# Patient Record
Sex: FEMALE | Race: WHITE
[De-identification: names, ages, dates, MRNs, and addresses within clinical notes are randomized per-mention and may not be internally consistent; named-entity substitution may affect disease eponyms.]

---

## 2019-02-26 ENCOUNTER — HOSPITAL ENCOUNTER (EMERGENCY)
Dept: HOSPITAL 63 - ER | Age: 44
Discharge: HOME | End: 2019-02-26
Payer: COMMERCIAL

## 2019-02-26 VITALS — HEIGHT: 63 IN | WEIGHT: 196 LBS | BODY MASS INDEX: 34.73 KG/M2

## 2019-02-26 VITALS — DIASTOLIC BLOOD PRESSURE: 73 MMHG | SYSTOLIC BLOOD PRESSURE: 129 MMHG

## 2019-02-26 DIAGNOSIS — R55: Primary | ICD-10-CM

## 2019-02-26 DIAGNOSIS — F32.9: ICD-10-CM

## 2019-02-26 DIAGNOSIS — E86.0: ICD-10-CM

## 2019-02-26 DIAGNOSIS — I10: ICD-10-CM

## 2019-02-26 LAB
ALBUMIN SERPL-MCNC: 3.7 G/DL (ref 3.4–5)
ALBUMIN/GLOB SERPL: 1 {RATIO} (ref 1–1.7)
ALP SERPL-CCNC: 97 U/L (ref 46–116)
ALT SERPL-CCNC: 19 U/L (ref 14–59)
ANION GAP SERPL CALC-SCNC: 10 MMOL/L (ref 6–14)
APTT PPP: YELLOW S
AST SERPL-CCNC: 14 U/L (ref 15–37)
BACTERIA #/AREA URNS HPF: (no result) /HPF
BASOPHILS # BLD AUTO: 0 X10^3/UL (ref 0–0.2)
BASOPHILS NFR BLD: 0 % (ref 0–3)
BILIRUB SERPL-MCNC: 0.4 MG/DL (ref 0.2–1)
BILIRUB UR QL STRIP: (no result)
BUN/CREAT SERPL: 29 (ref 6–20)
CA-I SERPL ISE-MCNC: 20 MG/DL (ref 7–20)
CALCIUM SERPL-MCNC: 8.9 MG/DL (ref 8.5–10.1)
CHLORIDE SERPL-SCNC: 106 MMOL/L (ref 98–107)
CO2 SERPL-SCNC: 23 MMOL/L (ref 21–32)
CREAT SERPL-MCNC: 0.7 MG/DL (ref 0.6–1)
EOSINOPHIL NFR BLD: 0.1 X10^3/UL (ref 0–0.7)
EOSINOPHIL NFR BLD: 2 % (ref 0–3)
ERYTHROCYTE [DISTWIDTH] IN BLOOD BY AUTOMATED COUNT: 14.7 % (ref 11.5–14.5)
FIBRINOGEN PPP-MCNC: (no result) MG/DL
GFR SERPLBLD BASED ON 1.73 SQ M-ARVRAT: 91.8 ML/MIN
GLOBULIN SER-MCNC: 3.6 G/DL (ref 2.2–3.8)
GLUCOSE SERPL-MCNC: 100 MG/DL (ref 70–99)
GLUCOSE UR STRIP-MCNC: (no result) MG/DL
HCT VFR BLD CALC: 42.5 % (ref 36–47)
HGB BLD-MCNC: 14.1 G/DL (ref 12–15.5)
HYALINE CASTS #/AREA URNS LPF: (no result) /HPF
INFLUENZA A PATIENT: NEGATIVE
INFLUENZA B PATIENT: NEGATIVE
LYMPHOCYTES # BLD: 0.8 X10^3/UL (ref 1–4.8)
LYMPHOCYTES NFR BLD AUTO: 9 % (ref 24–48)
MCH RBC QN AUTO: 28 PG (ref 25–35)
MCHC RBC AUTO-ENTMCNC: 33 G/DL (ref 31–37)
MCV RBC AUTO: 84 FL (ref 79–100)
MONO #: 0.5 X10^3/UL (ref 0–1.1)
MONOCYTES NFR BLD: 6 % (ref 0–9)
NEUT #: 7.8 X10^3UL (ref 1.8–7.7)
NEUTROPHILS NFR BLD AUTO: 84 % (ref 31–73)
NITRITE UR QL STRIP: (no result)
PLATELET # BLD AUTO: 188 X10^3/UL (ref 140–400)
POTASSIUM SERPL-SCNC: 4.1 MMOL/L (ref 3.5–5.1)
PREG TEST PT QUAL: NEGATIVE
PROT SERPL-MCNC: 7.3 G/DL (ref 6.4–8.2)
RBC # BLD AUTO: 5.08 X10^6/UL (ref 3.5–5.4)
RBC #/AREA URNS HPF: 0 /HPF (ref 0–2)
SODIUM SERPL-SCNC: 139 MMOL/L (ref 136–145)
SP GR UR STRIP: >=1.03
SQUAMOUS #/AREA URNS LPF: (no result) /LPF
UROBILINOGEN UR-MCNC: 0.2 MG/DL
WBC # BLD AUTO: 9.3 X10^3/UL (ref 4–11)
WBC #/AREA URNS HPF: (no result) /HPF (ref 0–4)

## 2019-02-26 PROCEDURE — 85025 COMPLETE CBC W/AUTO DIFF WBC: CPT

## 2019-02-26 PROCEDURE — 87804 INFLUENZA ASSAY W/OPTIC: CPT

## 2019-02-26 PROCEDURE — 74022 RADEX COMPL AQT ABD SERIES: CPT

## 2019-02-26 PROCEDURE — 83605 ASSAY OF LACTIC ACID: CPT

## 2019-02-26 PROCEDURE — 84484 ASSAY OF TROPONIN QUANT: CPT

## 2019-02-26 PROCEDURE — 96360 HYDRATION IV INFUSION INIT: CPT

## 2019-02-26 PROCEDURE — 96361 HYDRATE IV INFUSION ADD-ON: CPT

## 2019-02-26 PROCEDURE — 80053 COMPREHEN METABOLIC PANEL: CPT

## 2019-02-26 PROCEDURE — 36415 COLL VENOUS BLD VENIPUNCTURE: CPT

## 2019-02-26 PROCEDURE — 84703 CHORIONIC GONADOTROPIN ASSAY: CPT

## 2019-02-26 PROCEDURE — 81001 URINALYSIS AUTO W/SCOPE: CPT

## 2019-02-26 PROCEDURE — 87086 URINE CULTURE/COLONY COUNT: CPT

## 2019-02-26 NOTE — PHYS DOC
Past History


Past Medical History:  Depression, Hypertension


Past Surgical History:  Colectomy, Tonsillectomy


Smoking:  Non-smoker


Alcohol Use:  None


Drug Use:  None





Adult General


Chief Complaint


Chief Complaint:  SYNCOPE





HPI


HPI





Patient is a 44-year-old female who presents to the ED via EMS with a chief 

complaint of syncope. Patient states that this morning around 6:30 AM she had 

an episode of explosive diarrhea after that she had a syncopal episode. Patient 

states that her children were home and state that she passed out for about a 

minute. Her children called EMS who came to check on her. Patient's states that 

EMS told her that her blood pressure was very low that it was in the 90s. 

Patient states that she takes blood pressure medication every morning that has 

not taken it this morning. Patient states that she is nauseous but did not have 

any vomiting. Patient denies abdominal pain. Patient denies fever, chills, 

vomiting, dysuria, pregnancy, headache, chest pain, shortness of breath. 

Patient states that she is feeling fine currently. States that she is at her 

baseline. Patient states that she had previous episode where she also passed 

out at that time she tested positive for influenza.





Review of Systems


Review of Systems


Constitutional: Denies fever or chills 


HENT: Denies nasal congestion, sore throat, sinus tenderness


Respiratory: Denies cough or shortness of breath 


Cardiovascular: Denies CP


GI: Denies abdominal pain, vomiting or diarrhea. Complains of mild nausea


: Denies dysuria or hematuria 


Musculoskeletal: Denies back pain or joint pain 


Skin: Denies rash or skin lesions


Neurologic: Denies headache, focal weakness or sensory changes





Complete systems were reviewed and found to be within normal limits, except as 

documented in this note.





Allergies


Allergies





Allergies








Coded Allergies Type Severity Reaction Last Updated Verified


 


  No Known Allergies Allergy Unknown  2/26/19 Yes











Physical Exam


Physical Exam


Constitutional: Well developed, well nourished, no acute distress, non-toxic 

appearance.


HENT: Normocephalic, atraumatic, normocaphalic


Eyes: PERRL, EOMI


Neck: Normal range of motion, no tenderness, supple


Cardiovascular:Heart rate regular rhythm, no murmur 


Resp:  Bilateral breath sounds clear to auscultation 


Abdomen: Soft, no tenderness, no distension


Skin: Warm, dry, no erythema, no rash.


Back: No tenderness, no CVA tenderness.


Extremities: No tenderness, ROM intact, no edema.


Neurologic: Alert and oriented X 3, normal motor function, normal sensory 

function, no focal deficits noted.


Psychologic: Affect normal, judgement normal, mood normal.





Current Patient Data


Vital Signs





 Vital Signs








  Date Time  Temp Pulse Resp B/P (MAP) Pulse Ox O2 Delivery O2 Flow Rate FiO2


 


2/26/19 07:53 97.7 76 16  100 Room Air  











EKG


EKG


EKG interpretation:


7:58 on 2/26/2019


Heart rate: 65


Sinus bradycardia


Prolonged W[place] interval


Normal axis


Nonspecific ST changes


No STEMI





Radiology/Procedures


Radiology/Procedures


[]





Course & Med Decision Making


Course & Med Decision Making


Pertinent Labs and Imaging studies reviewed. (See chart for details)





Ordered labs, troponin, EKG, urine, urine pregnancy, acute abdominal series.





EKG shows no acute changes. There is increased W[place] interval.


Troponin is negative.


Urine pregnancy is negative. There is not enough urine given the patient and so 

we had to do urine pregnancy blood test.


UA is negative for UTI. There is mild leukocyte esterase.


Patient's lactate is 1.6.


Patient has already received 1 L IV fluids and states that she is feeling 

better.


I have ordered a second liter of IV fluids.





Patient has no focal deficits on exam. She is at baseline neurologically. I did 

not order a CT head at this time.








EXAM: Acute abdomen complete.


 


HISTORY: Syncope.


 


COMPARISON: None.


 


FINDINGS: A frontal view of the chest and frontal upright and supine views


of the abdomen are obtained. There is no infiltrate, pleural effusion or 


pneumothorax. There are calcified granulomas within the right hilum and 


right mid thorax. There are nonspecific air-fluid levels within 


nondistended loops of bowel within the upper abdomen and right midabdomen.


No dilated loop of bowel is seen. There is no transition point. There is 


no free air. There are cholecystectomy clips.


 


IMPRESSION: 


1. No acute pulmonary finding.


2. Nonspecific bowel gas pattern, without evidence of obstruction.


 


Electronically signed by: Janie Alonzo MD (2/26/2019 10:58 AM) [place] [id number]








Acute abdominal series does not show any acute disease.


Discussed results and plan of care the patient


Patient was discharged home for outpatient follow-up


Appropriate discharge instructions. Patient returned to the emergency 

department evaluation.


Patient instructed to follow up with his extremities.





Dragon Disclaimer


Dragon Disclaimer


This electronic medical record was generated, in whole or in part, using a 

voice recognition dictation system.





Departure


Departure:


Impression:  


 Primary Impression:  


 Syncope


 Additional Impression:  


 Dehydration


Disposition:  01 HOME, SELF-CARE


Condition:  IMPROVED


Referrals:  


[facility] (PCP)


in 1-2 days


Patient Instructions:  Dehydration, Adult, Syncope





Additional Instructions:  


Please follow up with this and wanted to reduce.


Please return to ED if symptoms worsen or if any concerns.





Problem Qualifiers











KENNEDI BELLAMY DO Feb 26, 2019 08:28

## 2019-02-26 NOTE — RAD
EXAM: Acute abdomen complete.

 

HISTORY: Syncope.

 

COMPARISON: None.

 

FINDINGS: A frontal view of the chest and frontal upright and supine views

of the abdomen are obtained. There is no infiltrate, pleural effusion or 

pneumothorax. There are calcified granulomas within the right hilum and 

right mid thorax. There are nonspecific air-fluid levels within 

nondistended loops of bowel within the upper abdomen and right midabdomen.

No dilated loop of bowel is seen. There is no transition point. There is 

no free air. There are cholecystectomy clips.

 

IMPRESSION: 

1. No acute pulmonary finding.

2. Nonspecific bowel gas pattern, without evidence of obstruction.

 

Electronically signed by: Janie Alonzo MD (2/26/2019 10:58 AM) [id number]

## 2022-05-17 ENCOUNTER — HOSPITAL ENCOUNTER (INPATIENT)
Dept: HOSPITAL 63 - ER | Age: 47
LOS: 2 days | Discharge: ANOTHER HOSPITAL | DRG: 641 | End: 2022-05-19
Attending: INTERNAL MEDICINE | Admitting: INTERNAL MEDICINE
Payer: COMMERCIAL

## 2022-05-17 VITALS — HEIGHT: 63 IN | WEIGHT: 198.86 LBS | BODY MASS INDEX: 35.23 KG/M2

## 2022-05-17 VITALS — DIASTOLIC BLOOD PRESSURE: 78 MMHG | SYSTOLIC BLOOD PRESSURE: 114 MMHG

## 2022-05-17 VITALS — DIASTOLIC BLOOD PRESSURE: 75 MMHG | SYSTOLIC BLOOD PRESSURE: 114 MMHG

## 2022-05-17 DIAGNOSIS — R50.9: ICD-10-CM

## 2022-05-17 DIAGNOSIS — N93.8: ICD-10-CM

## 2022-05-17 DIAGNOSIS — D63.8: ICD-10-CM

## 2022-05-17 DIAGNOSIS — Z90.49: ICD-10-CM

## 2022-05-17 DIAGNOSIS — E86.0: Primary | ICD-10-CM

## 2022-05-17 DIAGNOSIS — D72.829: ICD-10-CM

## 2022-05-17 DIAGNOSIS — Z20.822: ICD-10-CM

## 2022-05-17 DIAGNOSIS — I10: ICD-10-CM

## 2022-05-17 DIAGNOSIS — F32.A: ICD-10-CM

## 2022-05-17 DIAGNOSIS — D53.9: ICD-10-CM

## 2022-05-17 LAB
% BANDS: 9 % (ref 0–9)
% LYMPHS: 6 % (ref 24–48)
% MONOS: 1 % (ref 0–10)
% SEGS: 83 % (ref 35–66)
ALBUMIN SERPL-MCNC: 3.2 G/DL (ref 3.4–5)
ALBUMIN/GLOB SERPL: 1 {RATIO} (ref 1–1.7)
ALP SERPL-CCNC: 83 U/L (ref 46–116)
ALT SERPL-CCNC: 21 U/L (ref 14–59)
ANION GAP SERPL CALC-SCNC: 12 MMOL/L (ref 6–14)
ANISOCYTOSIS BLD QL SMEAR: SLIGHT
APTT PPP: YELLOW S
AST SERPL-CCNC: 17 U/L (ref 15–37)
BACTERIA #/AREA URNS HPF: 0 /HPF
BASOPHILS # BLD AUTO: 0.5 X10^3/UL (ref 0–0.2)
BASOPHILS NFR BLD: 1 % (ref 0–3)
BILIRUB SERPL-MCNC: 0.5 MG/DL (ref 0.2–1)
BUN/CREAT SERPL: 19 (ref 6–20)
CA-I SERPL ISE-MCNC: 15 MG/DL (ref 7–20)
CALCIUM SERPL-MCNC: 8.4 MG/DL (ref 8.5–10.1)
CHLORIDE SERPL-SCNC: 103 MMOL/L (ref 98–107)
CO2 SERPL-SCNC: 22 MMOL/L (ref 21–32)
CREAT SERPL-MCNC: 0.8 MG/DL (ref 0.6–1)
EOSINOPHIL NFR BLD AUTO: 1 % (ref 0–5)
EOSINOPHIL NFR BLD: 0 % (ref 0–3)
EOSINOPHIL NFR BLD: 0 X10^3/UL (ref 0–0.7)
ERYTHROCYTE [DISTWIDTH] IN BLOOD BY AUTOMATED COUNT: 18.6 % (ref 11.5–14.5)
FECAL OB PT: NEGATIVE
FIBRINOGEN PPP-MCNC: CLEAR MG/DL
GFR SERPLBLD BASED ON 1.73 SQ M-ARVRAT: 77.6 ML/MIN
GLOBULIN SER-MCNC: 3.3 G/DL (ref 2.2–3.8)
GLUCOSE SERPL-MCNC: 125 MG/DL (ref 70–99)
GLUCOSE UR STRIP-MCNC: (no result) MG/DL
HCT VFR BLD CALC: 27 % (ref 36–47)
HGB BLD-MCNC: 7.9 G/DL (ref 12–15.5)
HYPOCHROMIA BLD QL SMEAR: (no result)
INFLUENZA A PATIENT: NEGATIVE
INFLUENZA B PATIENT: NEGATIVE
LYMPHOCYTES # BLD: 1.2 X10^3/UL (ref 1–4.8)
LYMPHOCYTES NFR BLD AUTO: 4 % (ref 24–48)
MCH RBC QN AUTO: 18 PG (ref 25–35)
MCHC RBC AUTO-ENTMCNC: 29 G/DL (ref 31–37)
MCV RBC AUTO: 61 FL (ref 79–100)
MICROCYTES BLD QL SMEAR: (no result)
MONO #: 1.5 X10^3/UL (ref 0–1.1)
MONOCYTES NFR BLD: 5 % (ref 0–9)
NEUT #: 29.3 X10^3UL (ref 1.8–7.7)
NEUTROPHILS NFR BLD AUTO: 90 % (ref 31–73)
NITRITE UR QL STRIP: (no result)
PLATELET # BLD AUTO: 265 X10^3/UL (ref 140–400)
PLATELET # BLD EST: ADEQUATE 10*3/UL
POTASSIUM SERPL-SCNC: 3.6 MMOL/L (ref 3.5–5.1)
PROT SERPL-MCNC: 6.5 G/DL (ref 6.4–8.2)
RBC # BLD AUTO: 4.46 X10^6/UL (ref 3.5–5.4)
RBC #/AREA URNS HPF: 0 /HPF (ref 0–2)
SODIUM SERPL-SCNC: 137 MMOL/L (ref 136–145)
SP GR UR STRIP: >=1.03
SQUAMOUS #/AREA URNS LPF: (no result) /LPF
UROBILINOGEN UR-MCNC: 0.2 MG/DL
WBC # BLD AUTO: 32.5 X10^3/UL (ref 4–11)
WBC #/AREA URNS HPF: (no result) /HPF (ref 0–4)

## 2022-05-17 PROCEDURE — 86900 BLOOD TYPING SEROLOGIC ABO: CPT

## 2022-05-17 PROCEDURE — 86850 RBC ANTIBODY SCREEN: CPT

## 2022-05-17 PROCEDURE — 85025 COMPLETE CBC W/AUTO DIFF WBC: CPT

## 2022-05-17 PROCEDURE — 78582 LUNG VENTILAT&PERFUS IMAGING: CPT

## 2022-05-17 PROCEDURE — 30233N1 TRANSFUSION OF NONAUTOLOGOUS RED BLOOD CELLS INTO PERIPHERAL VEIN, PERCUTANEOUS APPROACH: ICD-10-PCS | Performed by: HOSPITALIST

## 2022-05-17 PROCEDURE — A9558 XE133 XENON 10MCI: HCPCS

## 2022-05-17 PROCEDURE — 71275 CT ANGIOGRAPHY CHEST: CPT

## 2022-05-17 PROCEDURE — 87040 BLOOD CULTURE FOR BACTERIA: CPT

## 2022-05-17 PROCEDURE — U0003 INFECTIOUS AGENT DETECTION BY NUCLEIC ACID (DNA OR RNA); SEVERE ACUTE RESPIRATORY SYNDROME CORONAVIRUS 2 (SARS-COV-2) (CORONAVIRUS DISEASE [COVID-19]), AMPLIFIED PROBE TECHNIQUE, MAKING USE OF HIGH THROUGHPUT TECHNOLOGIES AS DESCRIBED BY CMS-2020-01-R: HCPCS

## 2022-05-17 PROCEDURE — 80048 BASIC METABOLIC PNL TOTAL CA: CPT

## 2022-05-17 PROCEDURE — 93005 ELECTROCARDIOGRAM TRACING: CPT

## 2022-05-17 PROCEDURE — 86078 PHYS BLOOD BANK SERV REACTJ: CPT

## 2022-05-17 PROCEDURE — 86920 COMPATIBILITY TEST SPIN: CPT

## 2022-05-17 PROCEDURE — 36415 COLL VENOUS BLD VENIPUNCTURE: CPT

## 2022-05-17 PROCEDURE — P9016 RBC LEUKOCYTES REDUCED: HCPCS

## 2022-05-17 PROCEDURE — 96361 HYDRATE IV INFUSION ADD-ON: CPT

## 2022-05-17 PROCEDURE — 36430 TRANSFUSION BLD/BLD COMPNT: CPT

## 2022-05-17 PROCEDURE — 86901 BLOOD TYPING SEROLOGIC RH(D): CPT

## 2022-05-17 PROCEDURE — 84484 ASSAY OF TROPONIN QUANT: CPT

## 2022-05-17 PROCEDURE — 70450 CT HEAD/BRAIN W/O DYE: CPT

## 2022-05-17 PROCEDURE — 82274 ASSAY TEST FOR BLOOD FECAL: CPT

## 2022-05-17 PROCEDURE — 87428 SARSCOV & INF VIR A&B AG IA: CPT

## 2022-05-17 PROCEDURE — 71045 X-RAY EXAM CHEST 1 VIEW: CPT

## 2022-05-17 PROCEDURE — 85379 FIBRIN DEGRADATION QUANT: CPT

## 2022-05-17 PROCEDURE — 81001 URINALYSIS AUTO W/SCOPE: CPT

## 2022-05-17 PROCEDURE — A9540 TC99M MAA: HCPCS

## 2022-05-17 PROCEDURE — 85007 BL SMEAR W/DIFF WBC COUNT: CPT

## 2022-05-17 PROCEDURE — 96360 HYDRATION IV INFUSION INIT: CPT

## 2022-05-17 PROCEDURE — 80053 COMPREHEN METABOLIC PANEL: CPT

## 2022-05-17 RX ADMIN — SODIUM CHLORIDE SCH MLS/HR: 0.9 INJECTION, SOLUTION INTRAVENOUS at 22:09

## 2022-05-17 NOTE — PHYS DOC
Past History


Past Medical History:  Depression, Hypertension


Past Surgical History:  Cholecystectomy


Smoking:  Non-smoker


Alcohol Use:  None


Drug Use:  None





General Adult


EDM:


Chief Complaint:  NEAR SYCOPE





HPI:


HPI:


Patient is a 47-year-old female who presents to the emergency department for 

syncope.  Patient reports that yesterday she started experiencing fever, chills,

decreased appetite and a generalized headache.  She reports that today she has 

had multiple syncopal episodes.  She had a negative at-home COVID test.  Patient

reports that she does have a history of syncope associated with fevers.  She 

reports that she feels lightheaded with position changes which causes her to 

have syncopal episodes.  She is also reporting mild nausea.  She denies 

vomiting, diarrhea, vaginal bleeding, blood in her stools, chest pain, shortness

of breath, cough, sick exposures.  She denies any lightheadedness currently.  

She took Tylenol and ibuprofen 2 hours prior to arrival.





Review of Systems:


Review of Systems:


Constitutional:  see HPI


Respiratory:  see HPI


Cardiovascular:  see HPI


GI:  see HPI


Neurologic:  see HPI





Allergies:


Allergies:





Allergies








Coded Allergies Type Severity Reaction Last Updated Verified


 


  No Known Allergies Allergy Unknown  2/26/19 Yes











Physical Exam:


PE:





Constitutional: Well developed, well nourished, no acute distress, non-toxic 

appearance. []


HENT: Normocephalic, atraumatic, bilateral external ears normal, oropharynx zion

st, no oral exudates, nose normal. []


Eyes: PERRL, 4mm bilaterally, EOMI, conjunctiva normal, no discharge. [] 


Neck: Normal range of motion, no tenderness, supple, no stridor. [] 


Cardiovascular:Heart rate regular rhythm, no murmur []


Lungs & Thorax:  Bilateral breath sounds clear to auscultation []


Abdomen: Bowel sounds normal, soft, no tenderness, no masses, no pulsatile 

masses. [] 


Skin: Warm, dry, no erythema, no rash. [] 


Back: No tenderness, normal ROM


Extremities: No tenderness, no cyanosis, no clubbing, ROM intact, no edema. [] 


Neurologic: Alert and oriented X 3, normal motor function, normal sensory 

function, no focal deficits noted, no limb ataxia, no pronator drift, equal 

 strengths, normal speech. []


Psychologic: Affect normal, judgement normal, mood normal. []





Current Patient Data:


Labs:





Laboratory Tests








Test


 5/17/22


19:52 5/17/22


19:57 5/17/22


20:49


 


D-Dimer (Kavya) 0.70 mg/L   


 


White Blood Count  32.5 x10^3/uL  


 


Red Blood Count  4.46 x10^6/uL  


 


Hemoglobin  7.9 g/dL  


 


Hematocrit  27.0 %  


 


Mean Corpuscular Volume  61 fL  


 


Mean Corpuscular Hemoglobin  18 pg  


 


Mean Corpuscular Hemoglobin


Concent 


 29 g/dL 


 





 


Red Cell Distribution Width  18.6 %  


 


Platelet Count  265 x10^3/uL  


 


Neutrophils (%) (Auto)  90 %  


 


Lymphocytes (%) (Auto)  4 %  


 


Monocytes (%) (Auto)  5 %  


 


Eosinophils (%) (Auto)  0 %  


 


Basophils (%) (Auto)  1 %  


 


Neutrophils # (Auto)  29.3 x10^3uL  


 


Lymphocytes # (Auto)  1.2 x10^3/uL  


 


Monocytes # (Auto)  1.5 x10^3/uL  


 


Eosinophils # (Auto)  0.0 x10^3/uL  


 


Basophils # (Auto)  0.5 x10^3/uL  


 


Segmented Neutrophils %  83 %  


 


Band Neutrophils %  9 %  


 


Lymphocytes %  6 %  


 


Monocytes %  1 %  


 


Eosinophils %  1 %  


 


Platelet Estimate  Adequate  


 


Hypochromasia  Mod  


 


Anisocytosis  Slight  


 


Microcytosis  Mod  


 


Sodium Level  137 mmol/L  


 


Potassium Level  3.6 mmol/L  


 


Chloride Level  103 mmol/L  


 


Carbon Dioxide Level  22 mmol/L  


 


Anion Gap  12  


 


Blood Urea Nitrogen  15 mg/dL  


 


Creatinine  0.8 mg/dL  


 


Estimated GFR


(Cockcroft-Gault) 


 77.6 


 





 


BUN/Creatinine Ratio  19  


 


Glucose Level  125 mg/dL  


 


Calcium Level  8.4 mg/dL  


 


Total Bilirubin  0.5 mg/dL  


 


Aspartate Amino Transf


(AST/SGOT) 


 17 U/L 


 





 


Alanine Aminotransferase


(ALT/SGPT) 


 21 U/L 


 





 


Alkaline Phosphatase  83 U/L  


 


Troponin I High Sensitivity  4 ng/L  


 


Total Protein  6.5 g/dL  


 


Albumin  3.2 g/dL  


 


Albumin/Globulin Ratio  1.0  


 


Influenza Type A (Rapid)   Negative 


 


Influenza Type B (Rapid)   Negative 


 


SARS-CoV-2 Antigen (Rapid)   Negative 








Current Medications








 Medications


  (Trade)  Dose


 Ordered  Sig/Victorino


 Route


 PRN Reason  Start Time


 Stop Time Status Last Admin


Dose Admin


 


 Sodium Chloride  1,000 ml @ 


 1,000 mls/hr  1X  ONCE


 IV


   5/17/22 20:15


 5/17/22 21:14 DC 5/17/22 20:15











Vital Signs:





                                   Vital Signs








  Date Time  Temp Pulse Resp B/P (MAP) Pulse Ox O2 Delivery O2 Flow Rate FiO2


 


5/17/22 19:55 98.3 88 19 106/79 (88) 99 Room Air  











EKG:


EKG:


EKG performed by ER staff at 2024 shows sinus rhythm with a rate of 93, no STEMI

 read by Dr. Campbell at 2028.  []





Radiology/Procedures:


Radiology/Procedures:


[]


CT Head W/O Contrast:





History: Reason: syncope, dizziness / Spl. Instructions:  / History: 





Comparison: none





Axial images were obtained without contrast.





There is no mass effect, extraaxial fluid collections or hydrocephalus.  There 

is no gross bleed. Mild, patchy subcortical white matter hypoattenuation is seen

 in the frontal lobes.There is no focal loss of gray-white matter distinction to

 suggest acute ischemia, i.e. stroke.





Impression:  Mild white matter changes likely secondary to chronic small vessel 

disease.





RS Compliance Statement:





One or more of the following individualized dose reduction techniques were 

utilized for this examination:  


1. Automated exposure control  


2. Adjustment of the mA and/or kV according to patient size  


3. Use of iterative reconstruction technique





Electronically signed by: Nahun Kessler III, MD (5/17/2022 8:49 PM) [facility]














DICTATED AND SIGNED BY:     NAHUN KESSLER III, MD


DATE:     05/17/22 2046





CC: MARIELLA DALLAS KIANA L APRN ~





Heart Score:


C/O Chest Pain:  No


Risk Factors:


Risk Factors:  DM, Current or recent (<one month) smoker, HTN, HLP, family 

history of CAD, obesity.


Risk Scores:


Score 0 - 3:  2.5% MACE over next 6 weeks - Discharge Home


Score 4 - 6:  20.3% MACE over next 6 weeks - Admit for Clinical Observation


Score 7 - 10:  72.7% MACE over next 6 weeks - Early Invasive Strategies





Course & Med Decision Making:


Course & Med Decision Making


Pertinent Labs and Imaging studies reviewed. (See chart for details)





Patient presents to the ER for syncope associated with fever, chills, decreased 

appetite. Patient reports a hx of syncope with fevers. Work up consists of blood

 work including troponin, UA, EKG, CT imaging of head. Patient reports that she 

feels lightheaded with position changes especially standing which causes her 

syncope, therefore, she will be treated with IVF for suspected orthostatic 

hypotension. 


Patient CT scan of her head does not show any acute findings but chronic small v

essel changes.  Patient was noted to have leukocytosis with a white blood cell 

count of 32.5.  At this time we are still waiting on urinalysis to rule out 

urinary tract infection.  Chest x-ray was also ordered.  Patient was noted to 

have a hemoglobin of 7.9 hematocrit of 27.  Fecal occult was obtained.  Patient 

will need to have a blood transfusion she is tachycardic with a heart rate of 

109 and is having syncope with a hemoglobin of less than 8.  Type and screen 

ordered.  1 unit of packed red blood cells ordered for patient.  Patient's D-

dimer did come back slightly elevated.  Due to national contrast shortage, 

patient will have a VQ scan to rule out pulmonary embolisms while inpatient.  

COVID PCR ordered.  I discussed patient's case with Dr. Burroughs who agreed to admit

 the patient under his services.  I discussed patient's findings with her and 

she is agreeable to admission at this time.  ER bridge orders placed at 2121.





Dragon Disclaimer:


Dragon Disclaimer:


This electronic medical record was generated, in whole or in part, using a voice

 recognition dictation system.





Departure


Departure:


Impression:  


   Primary Impression:  


   Anemia


   Qualified Codes:  D64.9 - Anemia, unspecified


   Additional Impression:  


   Syncope


   Qualified Codes:  R55 - Syncope and collapse


Disposition:  09 ADMITTED AS INPATIENT


Admitting Physician:  Kneny Burroughs


Condition:  STABLE


Referrals:  


MARIELLA DALLAS (PCP)











MATT STONE APRN          May 17, 2022 20:18

## 2022-05-17 NOTE — RAD
CT Head W/O Contrast:



History: Reason: syncope, dizziness / Spl. Instructions:  / History: 



Comparison: none



Axial images were obtained without contrast.



There is no mass effect, extraaxial fluid collections or hydrocephalus.  There is no gross bleed. Mil
d, patchy subcortical white matter hypoattenuation is seen in the frontal lobes.There is no focal los
s of gray-white matter distinction to suggest acute ischemia, i.e. stroke.



Impression:  Mild white matter changes likely secondary to chronic small vessel disease.



PQRS Compliance Statement:



One or more of the following individualized dose reduction techniques were utilized for this examinat
ion:  

1. Automated exposure control  

2. Adjustment of the mA and/or kV according to patient size  

3. Use of iterative reconstruction technique



Electronically signed by: Gaudencio Cavazos III, MD (5/17/2022 8:49 PM) [facility]

## 2022-05-17 NOTE — RAD
Exam: Chest one view



INDICATION: Leukocytosis, syncope



TECHNIQUE: Frontal view of the chest



Comparisons: None



FINDINGS:

The cardiomediastinal silhouette and pulmonary vessels are within normal limits.



The lung and pleural spaces are clear.



IMPRESSION:

No acute cardiopulmonary process.



Electronically signed by: Kanwal Collins MD (5/17/2022 9:51 PM) [facility]

## 2022-05-18 VITALS — DIASTOLIC BLOOD PRESSURE: 76 MMHG | SYSTOLIC BLOOD PRESSURE: 120 MMHG

## 2022-05-18 VITALS — DIASTOLIC BLOOD PRESSURE: 65 MMHG | SYSTOLIC BLOOD PRESSURE: 112 MMHG

## 2022-05-18 VITALS — DIASTOLIC BLOOD PRESSURE: 77 MMHG | SYSTOLIC BLOOD PRESSURE: 118 MMHG

## 2022-05-18 VITALS — DIASTOLIC BLOOD PRESSURE: 75 MMHG | SYSTOLIC BLOOD PRESSURE: 120 MMHG

## 2022-05-18 VITALS — DIASTOLIC BLOOD PRESSURE: 62 MMHG | SYSTOLIC BLOOD PRESSURE: 109 MMHG

## 2022-05-18 VITALS — SYSTOLIC BLOOD PRESSURE: 104 MMHG | DIASTOLIC BLOOD PRESSURE: 65 MMHG

## 2022-05-18 LAB
ALBUMIN SERPL-MCNC: 2.9 G/DL (ref 3.4–5)
ALBUMIN/GLOB SERPL: 0.8 {RATIO} (ref 1–1.7)
ALP SERPL-CCNC: 75 U/L (ref 46–116)
ALT SERPL-CCNC: 23 U/L (ref 14–59)
ANION GAP SERPL CALC-SCNC: 13 MMOL/L (ref 6–14)
AST SERPL-CCNC: 12 U/L (ref 15–37)
BASOPHILS # BLD AUTO: 0.1 X10^3/UL (ref 0–0.2)
BASOPHILS NFR BLD: 0 % (ref 0–3)
BILIRUB SERPL-MCNC: 0.4 MG/DL (ref 0.2–1)
BUN/CREAT SERPL: 18 (ref 6–20)
CA-I SERPL ISE-MCNC: 14 MG/DL (ref 7–20)
CALCIUM SERPL-MCNC: 8.2 MG/DL (ref 8.5–10.1)
CHLORIDE SERPL-SCNC: 105 MMOL/L (ref 98–107)
CO2 SERPL-SCNC: 21 MMOL/L (ref 21–32)
CREAT SERPL-MCNC: 0.8 MG/DL (ref 0.6–1)
EOSINOPHIL NFR BLD: 0 % (ref 0–3)
EOSINOPHIL NFR BLD: 0 X10^3/UL (ref 0–0.7)
ERYTHROCYTE [DISTWIDTH] IN BLOOD BY AUTOMATED COUNT: 19.8 % (ref 11.5–14.5)
GFR SERPLBLD BASED ON 1.73 SQ M-ARVRAT: 77.6 ML/MIN
GLOBULIN SER-MCNC: 3.7 G/DL (ref 2.2–3.8)
GLUCOSE SERPL-MCNC: 94 MG/DL (ref 70–99)
HCT VFR BLD CALC: 28.6 % (ref 36–47)
HGB BLD-MCNC: 8.5 G/DL (ref 12–15.5)
LYMPHOCYTES # BLD: 0.9 X10^3/UL (ref 1–4.8)
LYMPHOCYTES NFR BLD AUTO: 3 % (ref 24–48)
MCH RBC QN AUTO: 19 PG (ref 25–35)
MCHC RBC AUTO-ENTMCNC: 30 G/DL (ref 31–37)
MCV RBC AUTO: 64 FL (ref 79–100)
MONO #: 1.3 X10^3/UL (ref 0–1.1)
MONOCYTES NFR BLD: 4 % (ref 0–9)
NEUT #: 28.7 X10^3UL (ref 1.8–7.7)
NEUTROPHILS NFR BLD AUTO: 93 % (ref 31–73)
PLATELET # BLD AUTO: 232 X10^3/UL (ref 140–400)
POTASSIUM SERPL-SCNC: 3.2 MMOL/L (ref 3.5–5.1)
PROT SERPL-MCNC: 6.6 G/DL (ref 6.4–8.2)
RBC # BLD AUTO: 4.46 X10^6/UL (ref 3.5–5.4)
SODIUM SERPL-SCNC: 139 MMOL/L (ref 136–145)
WBC # BLD AUTO: 31 X10^3/UL (ref 4–11)

## 2022-05-18 RX ADMIN — PIPERACILLIN SODIUM AND TAZOBACTAM SODIUM SCH MLS/HR: 3; .375 INJECTION, POWDER, LYOPHILIZED, FOR SOLUTION INTRAVENOUS at 17:24

## 2022-05-18 RX ADMIN — PIPERACILLIN SODIUM AND TAZOBACTAM SODIUM SCH MLS/HR: 3; .375 INJECTION, POWDER, LYOPHILIZED, FOR SOLUTION INTRAVENOUS at 23:37

## 2022-05-18 RX ADMIN — ACETAMINOPHEN PRN MG: 325 TABLET, FILM COATED ORAL at 15:20

## 2022-05-18 RX ADMIN — ACETAMINOPHEN PRN MG: 325 TABLET, FILM COATED ORAL at 08:24

## 2022-05-18 RX ADMIN — SODIUM CHLORIDE SCH MLS/HR: 0.9 INJECTION, SOLUTION INTRAVENOUS at 13:29

## 2022-05-18 RX ADMIN — PIPERACILLIN SODIUM AND TAZOBACTAM SODIUM SCH MLS/HR: 3; .375 INJECTION, POWDER, LYOPHILIZED, FOR SOLUTION INTRAVENOUS at 13:28

## 2022-05-18 RX ADMIN — Medication SCH CAP: at 21:05

## 2022-05-18 RX ADMIN — PIPERACILLIN SODIUM AND TAZOBACTAM SODIUM SCH MLS/HR: 3; .375 INJECTION, POWDER, LYOPHILIZED, FOR SOLUTION INTRAVENOUS at 07:40

## 2022-05-18 RX ADMIN — ACETAMINOPHEN PRN MG: 325 TABLET, FILM COATED ORAL at 02:14

## 2022-05-18 RX ADMIN — ACETAMINOPHEN PRN MG: 325 TABLET, FILM COATED ORAL at 21:06

## 2022-05-18 RX ADMIN — SODIUM CHLORIDE SCH MLS/HR: 0.9 INJECTION, SOLUTION INTRAVENOUS at 21:07

## 2022-05-18 NOTE — RAD
NUCLEAR MEDICINE VENTILATION PERFUSION SCAN 



History: Elevated d-dimer, shortness of air, fevers, chills, syncope.



Comparison:  Chest x-ray of 5/17/2022



Technique: Patient ventilated 8 mCi xenon-133. Perfusion portion performed after intravenous administ
ration of 5.0 mCi Technetium 99m MAA. Multiple projection planar images of the lungs were obtained. 



Findings: 

The ventilation images are homogeneous. No evidence of air trapping.



Perfusion images demonstrate a large mismatched defect in the superior segment left lower lobe and mo
derate anterior basal segment left lower lobe and superior lingular segment mismatch defects.  



IMPRESSION: 

High probability for pulmonary embolism.





Findings discussed with Dr. Duke at 5/18/2022 2:01 PM.



**********FOR INTERNAL CODING PURPOSES**********



RESULT CODE: (C)  



  







Electronically signed by: Caio Mejia MD (5/18/2022 2:01 PM) [id number]

## 2022-05-18 NOTE — RAD
CTA CHEST



History: Elevated d-dimer, shortness of air, fevers, chills, syncope. High probability for PE on VQ s
can however low clinical suspicion for PE. Fever workup.



Comparison: None.



Technique: CTA of the pulmonary arteries with intravenous contrast. 3-D postprocessing was performed.
 



Findings:

Pulmonary arteries: No pulmonary embolism.

Aorta and great vessels: No aneurysm or dissection of the aortic arch or thoracic aorta.

Thyroid: No significant abnormalities.

Mediastinum and marilou: No mediastinal masses or adenopathy is seen.

Esophagus: The visualized esophagus is normal.

Heart: The heart is normal in size. There is no pericardial effusion.

Airways, Lungs, Pleura: Subtle groundglass opacities involving the left lower lobe, to a lesser exten
t the left upper lobe and right lower lobe. No pleural effusion or pneumothorax.

Upper abdomen: Cholecystectomy clips. Small hiatal hernia. Postsurgical changes of the gastric wall f
rom probable sleeve gastrectomy.

Osseous structures and soft tissues: Within normal limits for age.



Impression: 



1.  No pulmonary embolism. 

2.  Mild groundglass opacification involving the left lower lobe and to lesser extent right lower lob
e and left upper lobe may represent dependent changes versus subtle infiltrate.





------

Exposure: One or more of the following individualized dose reduction techniques were utilized for thi
s examination:  

1. Automated exposure control

2. Adjustment of the mA and/or kV according to patient size

3. Use of iterative reconstruction technique.



Electronically signed by: Caio Mejia MD (5/18/2022 3:26 PM) [id number]

## 2022-05-19 ENCOUNTER — HOSPITAL ENCOUNTER (INPATIENT)
Dept: HOSPITAL 61 - 5 NORTH | Age: 47
LOS: 2 days | Discharge: LEFT BEFORE DISCHARGE | DRG: 312 | End: 2022-05-21
Payer: COMMERCIAL

## 2022-05-19 VITALS — HEIGHT: 63 IN | WEIGHT: 198.86 LBS | BODY MASS INDEX: 35.23 KG/M2

## 2022-05-19 VITALS — DIASTOLIC BLOOD PRESSURE: 73 MMHG | SYSTOLIC BLOOD PRESSURE: 122 MMHG

## 2022-05-19 VITALS — DIASTOLIC BLOOD PRESSURE: 70 MMHG | SYSTOLIC BLOOD PRESSURE: 110 MMHG

## 2022-05-19 VITALS — SYSTOLIC BLOOD PRESSURE: 116 MMHG | DIASTOLIC BLOOD PRESSURE: 72 MMHG

## 2022-05-19 VITALS — DIASTOLIC BLOOD PRESSURE: 72 MMHG | SYSTOLIC BLOOD PRESSURE: 127 MMHG

## 2022-05-19 VITALS — SYSTOLIC BLOOD PRESSURE: 120 MMHG | DIASTOLIC BLOOD PRESSURE: 71 MMHG

## 2022-05-19 VITALS — DIASTOLIC BLOOD PRESSURE: 65 MMHG | SYSTOLIC BLOOD PRESSURE: 131 MMHG

## 2022-05-19 VITALS — SYSTOLIC BLOOD PRESSURE: 114 MMHG | DIASTOLIC BLOOD PRESSURE: 76 MMHG

## 2022-05-19 DIAGNOSIS — F41.9: ICD-10-CM

## 2022-05-19 DIAGNOSIS — I10: ICD-10-CM

## 2022-05-19 DIAGNOSIS — N93.8: ICD-10-CM

## 2022-05-19 DIAGNOSIS — Z79.899: ICD-10-CM

## 2022-05-19 DIAGNOSIS — R91.8: ICD-10-CM

## 2022-05-19 DIAGNOSIS — K62.89: ICD-10-CM

## 2022-05-19 DIAGNOSIS — Z90.49: ICD-10-CM

## 2022-05-19 DIAGNOSIS — F32.A: ICD-10-CM

## 2022-05-19 DIAGNOSIS — Z90.89: ICD-10-CM

## 2022-05-19 DIAGNOSIS — D50.9: ICD-10-CM

## 2022-05-19 DIAGNOSIS — D72.829: ICD-10-CM

## 2022-05-19 DIAGNOSIS — R55: Primary | ICD-10-CM

## 2022-05-19 DIAGNOSIS — Z98.84: ICD-10-CM

## 2022-05-19 DIAGNOSIS — Z53.29: ICD-10-CM

## 2022-05-19 LAB
ANION GAP SERPL CALC-SCNC: 12 MMOL/L (ref 6–14)
BASOPHILS # BLD AUTO: 0 X10^3/UL (ref 0–0.2)
BASOPHILS NFR BLD: 0 % (ref 0–3)
CA-I SERPL ISE-MCNC: 9 MG/DL (ref 7–20)
CALCIUM SERPL-MCNC: 8.1 MG/DL (ref 8.5–10.1)
CHLORIDE SERPL-SCNC: 104 MMOL/L (ref 98–107)
CO2 SERPL-SCNC: 20 MMOL/L (ref 21–32)
CREAT SERPL-MCNC: 0.8 MG/DL (ref 0.6–1)
EOSINOPHIL NFR BLD: 0 % (ref 0–3)
EOSINOPHIL NFR BLD: 0 X10^3/UL (ref 0–0.7)
ERYTHROCYTE [DISTWIDTH] IN BLOOD BY AUTOMATED COUNT: 20.7 % (ref 11.5–14.5)
GFR SERPLBLD BASED ON 1.73 SQ M-ARVRAT: 77.6 ML/MIN
GLUCOSE SERPL-MCNC: 97 MG/DL (ref 70–99)
HCT VFR BLD CALC: 26.3 % (ref 36–47)
HGB BLD-MCNC: 7.9 G/DL (ref 12–15.5)
LYMPHOCYTES # BLD: 1.1 X10^3/UL (ref 1–4.8)
LYMPHOCYTES NFR BLD AUTO: 4 % (ref 24–48)
MCH RBC QN AUTO: 19 PG (ref 25–35)
MCHC RBC AUTO-ENTMCNC: 30 G/DL (ref 31–37)
MCV RBC AUTO: 63 FL (ref 79–100)
MONO #: 1.1 X10^3/UL (ref 0–1.1)
MONOCYTES NFR BLD: 5 % (ref 0–9)
NEUT #: 22.8 X10^3UL (ref 1.8–7.7)
NEUTROPHILS NFR BLD AUTO: 91 % (ref 31–73)
PLATELET # BLD AUTO: 183 X10^3/UL (ref 140–400)
POTASSIUM SERPL-SCNC: 3.1 MMOL/L (ref 3.5–5.1)
RBC # BLD AUTO: 4.15 X10^6/UL (ref 3.5–5.4)
SODIUM SERPL-SCNC: 136 MMOL/L (ref 136–145)
WBC # BLD AUTO: 25.1 X10^3/UL (ref 4–11)

## 2022-05-19 PROCEDURE — 87505 NFCT AGENT DETECTION GI: CPT

## 2022-05-19 PROCEDURE — 87493 C DIFF AMPLIFIED PROBE: CPT

## 2022-05-19 PROCEDURE — 83550 IRON BINDING TEST: CPT

## 2022-05-19 PROCEDURE — 71250 CT THORAX DX C-: CPT

## 2022-05-19 PROCEDURE — 85651 RBC SED RATE NONAUTOMATED: CPT

## 2022-05-19 PROCEDURE — 85025 COMPLETE CBC W/AUTO DIFF WBC: CPT

## 2022-05-19 PROCEDURE — 82728 ASSAY OF FERRITIN: CPT

## 2022-05-19 PROCEDURE — G0378 HOSPITAL OBSERVATION PER HR: HCPCS

## 2022-05-19 PROCEDURE — 74176 CT ABD & PELVIS W/O CONTRAST: CPT

## 2022-05-19 PROCEDURE — 87040 BLOOD CULTURE FOR BACTERIA: CPT

## 2022-05-19 PROCEDURE — 36415 COLL VENOUS BLD VENIPUNCTURE: CPT

## 2022-05-19 PROCEDURE — 86140 C-REACTIVE PROTEIN: CPT

## 2022-05-19 PROCEDURE — 83540 ASSAY OF IRON: CPT

## 2022-05-19 PROCEDURE — 80053 COMPREHEN METABOLIC PANEL: CPT

## 2022-05-19 PROCEDURE — 80048 BASIC METABOLIC PNL TOTAL CA: CPT

## 2022-05-19 RX ADMIN — SODIUM CHLORIDE SCH MLS/HR: 0.9 INJECTION, SOLUTION INTRAVENOUS at 10:35

## 2022-05-19 RX ADMIN — ACETAMINOPHEN PRN MG: 325 TABLET, FILM COATED ORAL at 09:46

## 2022-05-19 RX ADMIN — Medication SCH CAP: at 09:00

## 2022-05-19 RX ADMIN — ACETAMINOPHEN PRN MG: 325 TABLET, FILM COATED ORAL at 14:32

## 2022-05-19 RX ADMIN — PIPERACILLIN SODIUM AND TAZOBACTAM SODIUM SCH MLS/HR: 3; .375 INJECTION, POWDER, LYOPHILIZED, FOR SOLUTION INTRAVENOUS at 05:17

## 2022-05-19 RX ADMIN — DEXTROSE, SODIUM CHLORIDE, AND POTASSIUM CHLORIDE SCH MLS/HR: 5; .45; .3 INJECTION INTRAVENOUS at 14:30

## 2022-05-19 RX ADMIN — ACETAMINOPHEN PRN MG: 325 TABLET, FILM COATED ORAL at 19:23

## 2022-05-19 NOTE — DS
DATE OF DISCHARGE: 05/19/2022

ATTENDING PHYSICIAN:  Dr. Milner.



FINAL DISCHARGE DIAGNOSES:

1.  Fever of unknown origin.

2.  Dehydration causing syncope.

3.  Anemia of chronic disease.

4.  Labile hypertension.

5.  Previous history of cholecystectomy.

6.  Probable dysfunctional uterine bleeding causing anemia.



HISTORY AND PHYSICAL:  The patient is a very pleasant 47-year-old female.  She 

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.  She presented to the hospital with a 2-day history of fevers, chills,

nausea, headache, near syncope and chronic anemia.



PHYSICAL EXAMINATION:  Please see my dictated note.



PERTINENT LABORATORY AND X-RAY STUDIES:  Blood cultures were negative at 36 

hours.  Three sets were drawn.  Her hemoglobin was 7.9 g, which is baseline and 

repeat was 8.5 g.  She has had chronic anemia.  The MCV is 64 suggesting iron 

deficiency.  White count was 32,500; second day was 31,000; third day was 

25,100.  Differential shows predominantly neutrophils 93%.  Chemistry panel, 

stable BUN and creatinine, electrolytes.  Nonfasting blood sugar 125.  Serology 

negative for influenza A, influenza B and coronavirus.  Urinalysis showed a 

specific gravity of 1.030; otherwise, unremarkable.  Stool for occult blood was 

negative.



COURSE IN HOSPITAL:  The patient was admitted with acute febrile illness and a 

fever of unknown origin.  Her chest x-ray was clear.  She had an equivocal 

ventilation perfusion scan in the ED which prompted us to order a CT angiogram, 

which ruled out pulmonary embolus.  No acute infiltrates were identified.  She 

was concerned about leukemia.  I explained to her, her elevated white count was 

a leukemoid reaction.  The differential was predominantly neutrophils.  She was 

given empiric vancomycin and Zosyn.  She still had febrile issues on the second 

hospital day.  She did not feel well. Because of her symptoms and her febrile 

illness and still with fever of unknown origin, I have decided to transfer her 

to [facility].  Dr. Champagne has agreed to take the patient in 

transfer.  She will have an infectious disease consult and further serology 

regarding the fever of unknown origin.  Therefore, on the third hospital day, 

she was transferred with continuation of IV fluids and Zosyn q. 6 hours.  Dr. Champagne will be the attending physician.  She is transferred to their medical floor

at [facility].



Total discharge time spent 41 minutes.







FABRICIO

DR: Antonietta   DD: 05/19/2022 10:11

DT: 05/19/2022 18:28   TID: [id number]

CC:     BONNIE CHAMPAGNE MD

## 2022-05-20 VITALS — DIASTOLIC BLOOD PRESSURE: 57 MMHG | SYSTOLIC BLOOD PRESSURE: 103 MMHG

## 2022-05-20 VITALS — DIASTOLIC BLOOD PRESSURE: 57 MMHG | SYSTOLIC BLOOD PRESSURE: 110 MMHG

## 2022-05-20 VITALS — DIASTOLIC BLOOD PRESSURE: 77 MMHG | SYSTOLIC BLOOD PRESSURE: 120 MMHG

## 2022-05-20 VITALS — SYSTOLIC BLOOD PRESSURE: 119 MMHG | DIASTOLIC BLOOD PRESSURE: 53 MMHG

## 2022-05-20 VITALS — SYSTOLIC BLOOD PRESSURE: 119 MMHG | DIASTOLIC BLOOD PRESSURE: 67 MMHG

## 2022-05-20 VITALS — DIASTOLIC BLOOD PRESSURE: 80 MMHG | SYSTOLIC BLOOD PRESSURE: 134 MMHG

## 2022-05-20 LAB
ALBUMIN SERPL-MCNC: 2.4 G/DL (ref 3.4–5)
ALBUMIN/GLOB SERPL: 0.6 {RATIO} (ref 1–1.7)
ALP SERPL-CCNC: 74 U/L (ref 46–116)
ALT SERPL-CCNC: 26 U/L (ref 14–59)
ANION GAP SERPL CALC-SCNC: 9 MMOL/L (ref 6–14)
ANISOCYTOSIS BLD QL SMEAR: (no result)
AST SERPL-CCNC: 21 U/L (ref 15–37)
BASOPHILS # BLD AUTO: 0 X10^3/UL (ref 0–0.2)
BASOPHILS NFR BLD: 0 % (ref 0–3)
BILIRUB SERPL-MCNC: 0.2 MG/DL (ref 0.2–1)
BIZZARE CELLS: (no result)
BUN SERPL-MCNC: 7 MG/DL (ref 7–20)
BUN/CREAT SERPL: 10 (ref 6–20)
CALCIUM SERPL-MCNC: 8.1 MG/DL (ref 8.5–10.1)
CHLORIDE SERPL-SCNC: 111 MMOL/L (ref 98–107)
CO2 SERPL-SCNC: 21 MMOL/L (ref 21–32)
CREAT SERPL-MCNC: 0.7 MG/DL (ref 0.6–1)
CRP SERPL-MCNC: 296.4 MG/L (ref 0–3.3)
EOSINOPHIL NFR BLD: 0.1 X10^3/UL (ref 0–0.7)
EOSINOPHIL NFR BLD: 1 % (ref 0–3)
ERYTHROCYTE [DISTWIDTH] IN BLOOD BY AUTOMATED COUNT: 20.3 % (ref 11.5–14.5)
GFR SERPLBLD BASED ON 1.73 SQ M-ARVRAT: 90.5 ML/MIN
GLUCOSE SERPL-MCNC: 112 MG/DL (ref 70–99)
HCT VFR BLD CALC: 24.6 % (ref 36–47)
HGB BLD-MCNC: 7.5 G/DL (ref 12–15.5)
HYPOCHROMIA BLD QL SMEAR: (no result)
LYMPHOCYTES # BLD: 1.5 X10^3/UL (ref 1–4.8)
LYMPHOCYTES NFR BLD AUTO: 10 % (ref 24–48)
MCH RBC QN AUTO: 19 PG (ref 25–35)
MCHC RBC AUTO-ENTMCNC: 31 G/DL (ref 31–37)
MCV RBC AUTO: 62 FL (ref 79–100)
MICROCYTES BLD QL SMEAR: (no result)
MONO #: 0.8 X10^3/UL (ref 0–1.1)
MONOCYTES NFR BLD: 5 % (ref 0–9)
NEUT #: 12.5 X10^3/UL (ref 1.8–7.7)
NEUTROPHILS NFR BLD AUTO: 84 % (ref 31–73)
OVALOCYTES BLD QL SMEAR: (no result)
PLATELET # BLD AUTO: 151 X10^3/UL (ref 140–400)
PLATELET # BLD EST: ADEQUATE 10*3/UL
POIKILOCYTOSIS BLD QL SMEAR: SLIGHT
POTASSIUM SERPL-SCNC: 3.4 MMOL/L (ref 3.5–5.1)
PROT SERPL-MCNC: 6.5 G/DL (ref 6.4–8.2)
RBC # BLD AUTO: 4 X10^6/UL (ref 3.5–5.4)
SODIUM SERPL-SCNC: 141 MMOL/L (ref 136–145)
TARGETS BLD QL SMEAR: (no result)
WBC # BLD AUTO: 14.8 X10^3/UL (ref 4–11)

## 2022-05-20 RX ADMIN — PIPERACILLIN SODIUM AND TAZOBACTAM SODIUM SCH MLS/HR: 3; .375 INJECTION, POWDER, LYOPHILIZED, FOR SOLUTION INTRAVENOUS at 00:43

## 2022-05-20 RX ADMIN — PIPERACILLIN SODIUM AND TAZOBACTAM SODIUM SCH MLS/HR: 3; .375 INJECTION, POWDER, LYOPHILIZED, FOR SOLUTION INTRAVENOUS at 23:59

## 2022-05-20 RX ADMIN — ACETAMINOPHEN PRN MG: 325 TABLET, FILM COATED ORAL at 10:54

## 2022-05-20 RX ADMIN — PIPERACILLIN SODIUM AND TAZOBACTAM SODIUM SCH MLS/HR: 3; .375 INJECTION, POWDER, LYOPHILIZED, FOR SOLUTION INTRAVENOUS at 12:04

## 2022-05-20 RX ADMIN — PIPERACILLIN SODIUM AND TAZOBACTAM SODIUM SCH MLS/HR: 3; .375 INJECTION, POWDER, LYOPHILIZED, FOR SOLUTION INTRAVENOUS at 06:01

## 2022-05-20 RX ADMIN — ACETAMINOPHEN PRN MG: 325 TABLET, FILM COATED ORAL at 01:27

## 2022-05-20 RX ADMIN — DEXTROSE, SODIUM CHLORIDE, AND POTASSIUM CHLORIDE SCH MLS/HR: 5; .45; .3 INJECTION INTRAVENOUS at 03:00

## 2022-05-20 RX ADMIN — ACETAMINOPHEN PRN MG: 325 TABLET, FILM COATED ORAL at 16:12

## 2022-05-20 RX ADMIN — PIPERACILLIN SODIUM AND TAZOBACTAM SODIUM SCH MLS/HR: 3; .375 INJECTION, POWDER, LYOPHILIZED, FOR SOLUTION INTRAVENOUS at 18:38

## 2022-05-20 RX ADMIN — DEXTROSE, SODIUM CHLORIDE, AND POTASSIUM CHLORIDE SCH MLS/HR: 5; .45; .3 INJECTION INTRAVENOUS at 15:30

## 2022-05-20 RX ADMIN — ACETAMINOPHEN PRN MG: 325 TABLET, FILM COATED ORAL at 20:42

## 2022-05-20 NOTE — HP
DATE OF SERVICE: 05/19/2022

ADMIT DATE: 05/19/2022

HISTORY OF PRESENT ILLNESS:  The patient is a 47-year-old  female 

patient who presented to the Emergency Room of [facility] with 

complaint of near syncope.  She apparently has 2 syncopal episodes at home.  She

also started experiencing fever, chills, decreased appetite and generalized 

headache.  She reports that she has multiple syncopal episodes.  On the day of 

admission, she had a negative COVID test at home.  She stated that she had had a

history of syncope associated with fevers.  She reports that she feels 

lightheaded with position changes, which caused her to have syncopal episode.  

She is also reporting mild nausea.  Denied any vomiting, diarrhea, vaginal 

bleed, hematemesis, melena or hematochezia.  Denied any chest pain or shortness 

of breath.  Denied any cough, phlegm or hemoptysis.  By the time she arrived to 

the Emergency Room, she has already taken Tylenol and ibuprofen.  She was 

extensively investigated in the Emergency Room and has had lab work and imaging 

studies.



Her lab work showed that she has marked leukocytosis, a white cell count of 

32,500; hemoglobin 7.9; hematocrit 27; MCV 61 and platelet count of 265,000 with

a manual differential showed 90% polymorphs, 4% lymphocytes and 5% monocytes.  

The blood smear showed that she has moderate hypochromasia, anisocytosis and 

microcytosis.  Her chemistry was mostly unremarkable with serum sodium 137, 

potassium 3.6, chloride 103, bicarbonate 22, anion gap of 12, BUN 0.8.  

Estimated GFR was 77 mL per minute.  Her glucose was 125, calcium was 8.4.  

Total bilirubin, AST, ALT, alkaline phosphatase were normal.  Total protein 6.5,

albumin 3.2.  Her D-dimer was 0.7.  Urinalysis essentially unremarkable and her 

coronavirus by rapid antigen testing and by PCR were negative.  Her influenza A 

and B were negative, and her stool for occult blood was negative.  She had blood

cultures drawn and the patient was ____ and was admitted to [facility] 

where she was started empirically on Zosyn and vancomycin.  Unfortunately, the 

patient continued to spike her temperature despite the antibiotic therapy and 

therefore, the patient was transferred to [facility] for further 

evaluation and to consult the Infectious Disease specialist.



PAST MEDICAL HISTORY: Significant for anxiety and depression as well as 

hypertension.



PAST SURGICAL HISTORY:  Significant for tonsillectomy, cholecystectomy and 

gastric bypass surgery.



MEDICATIONS:  She is currently on no medication.



ALLERGIES:  She has no known drug allergies.



FAMILY HISTORY:  Noncontributory.



SOCIAL HISTORY:  She is , has a son and a daughter.  She does not smoke, 

drink alcohol or use recreational drugs.  She works as a teacher at [employer].



REVIEW OF SYSTEMS:  The patient denied any blurring of vision, cataracts, 

glaucoma or macular degeneration.  Denied any earache, tinnitus or sensory 

deafness.  Denied any nosebleeds, stuffy nose or postnasal drip.  Denied any 

sore throat, sore tongue, toothache, hoarseness of voice or difficulty 

swallowing.  Did complain of some mild nausea and she also is known to have 

gastroesophageal reflux disease.  Denied any hematemesis, melena or 

hematochezia.  Denied any dysuria, frequency or hematuria.  Denied any chest 

pain, shortness of breath, orthopnea or paroxysmal nocturnal dyspnea.  Denied 

any cough, phlegm or hemoptysis.  Did complain of dizziness and lightheadedness 

and did complain of some mild headache, but denied any photophobia.



PHYSICAL EXAMINATION:

GENERAL:  When I saw her today, she was pale, but not jaundiced or cyanosed.  No

lymphadenopathy, no thyromegaly, no jugular venous distention.  No lower limb 

edema.

VITAL SIGNS:  Her heart rate was 109, blood pressure was 120/71, temperature was

101.4, respiratory rate 20, and oxygen saturation was 95% on room air.

HEAD, EYES, EARS, NOSE, AND THROAT:  Normocephalic, atraumatic.

NECK:  Supple.

HEART:  Showed normal first and second heart sounds.  No gallop, rub or murmur.

CHEST:  Clear to auscultation, no crepitation or rhonchi.

ABDOMEN:  Distended, soft, nontender.

NEUROLOGIC:  She was awake, alert, responding appropriately.  All cranial nerves

intact.  She moves extremities without difficulty.



LABORATORY DATA:  Her most recent lab work as of this morning showed a white 

cell count 25,000; hemoglobin 7.9; hematocrit 26.3; MCV 63 and platelet count of

183,000 with a manual differential showed 91% polymorphs, 4% lymphocytes.  Her 

chemistry showed a serum sodium 139, potassium 3.1, chloride 104, bicarbonate 

20, anion gap of 12, BUN 9, creatinine 0.8.  Estimated GFR was 77 mL per minute.

 Her glucose was 97 and calcium was 8.1.  Her D-dimer was 0.7.  Urinalysis 

essentially unremarkable except for small amount of protein.  Urine was negative

for glucose, small amount of ketones, negative for blood, nitrite, small amount 

of bilirubin, negative for leukocyte esterase, 0 rbc's, 1-4 wbc's and no 

bacteria.  It is worth noting that the patient was vaccinated for COVID as well 

as flu.  She has not traveled anywhere and has been consistently within [facility].  She currently lives in [place], uses the [facility] water.  They do have 

animals at home including horses, pigs, dogs and cats.  None of other family 

members have similar symptoms.



PLAN:  My plan is to consult, Dr. Canela.  I will check her iron studies as 

apparently an attempt was made to transfuse her 1 unit of blood and she spiked 

her temperature more at that time and she did receive some blood but has not 

completed the whole unit and it was sent to the lab to make sure that there was 

not a mismatch of blood.







MATT/MIGUELINA/MICHAEL

DR: AMM/bhavna   DD: 05/19/2022 14:00

DT: 05/19/2022 16:32   TID: [id number]

## 2022-05-20 NOTE — CONS
DATE OF CONSULTATION: 05/20/2022

REQUESTING PHYSICIAN:  Dr. Castellano.



REASON FOR CONSULTATION:  Fever and leukocytosis.



HISTORY OF PRESENT ILLNESS:  This is a 47-year-old  female who is a 

 who apparently came in home with not feeling well, some fever and

chills and then in the bathroom at home, she passed out at night.  The patient 

was out, she thinks maybe for a minute or less.  Her  picked her up and 

then brought to the ER.  The patient had testing done.  Other than high white 

count and fever, all testing including COVID was negative.  The patient was put 

on vancomycin and Zosyn and then eventually the patient was transferred from 

[place] to here.  The patient this morning, alert, awake.  The patient says 

she is feeling much better.  The patient denies any headache, occasionally she 

has headache.  Denies any nausea, vomiting, diarrhea, chest pain, shortness of 

breath, abdominal pain, urinary symptoms or bowel symptoms.  The patient has had

episode of one time passing out before, which was related to weight loss and 

blood pressure medication with low blood pressure.  She has been off blood 

pressure medication and she has been fine since then.



PAST MEDICAL HISTORY:  Positive for gastric bypass, tonsillectomy, 

cholecystectomy, history of anxiety and depression.



SOCIAL HISTORY:  Negative for smoking, alcohol, or illicit drug use.



ALLERGIES:  No known drug allergies.



CURRENT MEDICATIONS:  Reviewed.



REVIEW OF SYSTEMS:  As in HPI.  All other systems reviewed are negative.



PHYSICAL EXAMINATION:

GENERAL:  Alert, oriented female, not in distress.

VITAL SIGNS:  Temperature 98.2 with a T-max 102, pulse 80, respirations 20, 

blood pressure 120/77.

HEENT:  Both pupils are round and reacting.  No conjunctival lesion, no lesion 

in the mouth.

NECK:  Supple, no JVP, no lymphadenopathy.

LUNGS:  Clear.

HEART:  S1, S2, regular.

ABDOMEN:  Soft, nontender, no organomegaly.

EXTREMITIES:  No edema or cyanosis.

SKIN:  Unremarkable.

NEUROLOGIC:  The patient is alert, awake, and appropriate.  No focal neurologic 

deficit.



LABORATORY DATA:  White count is down from 32,000 to 14.8, platelets are normal.

 BUN and creatinine is normal.  Liver functions are normal.



IMPRESSION:

1.  Syncopal episode.

2.  Fever.

3.  Leukocytosis.

4.  History of hypertension.

5.  History of gastric bypass.



RECOMMENDATIONS:  Continue Zosyn, discontinue vancomycin.  We will get CT chest,

abdomen and pelvis without contrast and will continue to follow.



Thank you very much, Dr. Castellano, for giving me opportunity to participate in this 

patient's care.







SIMEON/MIGUELINA

DR: SIMEON/bhavna   DD: 05/20/2022 08:57

DT: 05/20/2022 10:19   TID: [id number]

## 2022-05-20 NOTE — RAD
EXAMINATION: CT chest, abdomen and pelvis without IV contrast.



INDICATION:47 years, Female, fever.



TECHNIQUE: Axial CT images of the chest, abdomen and pelvis were obtained. Coronal and sagittal refor
matted performed.



COMPARISON:  None.



Exposure: One or more of the following individualized dose reduction techniques were utilized for thi
s examination:  

1. Automated exposure control  

2. Adjustment of the mA and/or kV according to patient size  

3. Use of iterative reconstruction technique.



FINDINGS:



CHEST:

Central airways are patent. No focal consolidation, pleural effusion or pneumothorax. Minimal depende
nt subsegmental atelectasis in bibasilar lungs. There is a 2.6 mm, solid pulmonary nodule in the post
erior right upper lobe (series 3 image 17). There is a 1.5 mm, solid pulmonary nodule in the right lo
wer lobe (series 3 image 34). Calcified granuloma in the right middle lobe.



Visualized thyroid and esophagus are unremarkable. Small hiatal hernia. No pathologically enlarged ly
mph nodes in the chest by size criteria. Calcified right hilar lymph node. Normal cardiac size with n
o pericardial effusion. No coronary artery atherosclerotic calcifications. Normal caliber thoracic ao
rta and pulmonary arteries.



ABDOMEN/PELVIS:

Within the limitation of noncontrast exam,



Liver, spleen, biliary ducts, pancreas, adrenal glands and kidneys are unremarkable. Cholecystectomy.
 Post sleeve gastrectomy changes. No bowel dilation. Normal appendix. Large amount of mesorectal and 
presacral fat stranding with edema. No significant rectal wall thickening. Normal caliber abdominal a
orlin. No pneumoperitoneum or ascites. No lymphadenopathy in the abdomen or pelvis by size criteria. U
nremarkable urinary bladder. Ill-defined 5.8 cm left adnexal cystic lesion with Hounsfield unit measu
res 36. Uterus is grossly unremarkable.



MUSCULOSKELETAL STRUCTURES: 

Degenerative changes at L4-5 with posterior disc osteophyte complex causing mild canal stenosis. No a
cute osseous process or suspicious lesion.   



IMPRESSION:

1. Large amount of mesorectal and presacral fat stranding with edema. No significant rectal or urinar
y bladder wall thickening. Findings nonspecific, may relate to mild acute proctitis for cystitis. Cli
nical correlation is advised.

2. Ill-defined 5.8 cm left adnexal cystic mass, could represent a complicated hemorrhagic cyst. Recom
mend further evaluation with pelvic ultrasound.

3. No acute process in the chest.

4. Sub-5 mm solid pulmonary nodules in the right lung. High-risk patient, consider 12 months follow-u
p with CT chest.



Electronically signed by: Dewayne Ulloa MD (5/20/2022 10:22 AM) [id number]

## 2022-05-20 NOTE — NUR
SS following for discharge planning. SS reviewed pt chart and discussed with pt RN. Pt is 
from home with family and is currently on room air. ID following. Pt on IV Zosyn. SS will 
continue to follow for discharge planning.

## 2022-05-21 VITALS — DIASTOLIC BLOOD PRESSURE: 77 MMHG | SYSTOLIC BLOOD PRESSURE: 131 MMHG

## 2022-05-21 VITALS — SYSTOLIC BLOOD PRESSURE: 126 MMHG | DIASTOLIC BLOOD PRESSURE: 71 MMHG

## 2022-05-21 LAB
ANION GAP SERPL CALC-SCNC: 11 MMOL/L (ref 6–14)
BASOPHILS # BLD AUTO: 0.1 X10^3/UL (ref 0–0.2)
BASOPHILS NFR BLD: 1 % (ref 0–3)
BUN SERPL-MCNC: 5 MG/DL (ref 7–20)
CALCIUM SERPL-MCNC: 7.9 MG/DL (ref 8.5–10.1)
CHLORIDE SERPL-SCNC: 110 MMOL/L (ref 98–107)
CO2 SERPL-SCNC: 21 MMOL/L (ref 21–32)
CREAT SERPL-MCNC: 0.6 MG/DL (ref 0.6–1)
EOSINOPHIL NFR BLD: 0.2 X10^3/UL (ref 0–0.7)
EOSINOPHIL NFR BLD: 2 % (ref 0–3)
ERYTHROCYTE [DISTWIDTH] IN BLOOD BY AUTOMATED COUNT: 20.5 % (ref 11.5–14.5)
GFR SERPLBLD BASED ON 1.73 SQ M-ARVRAT: 108.1 ML/MIN
GLUCOSE SERPL-MCNC: 79 MG/DL (ref 70–99)
HCT VFR BLD CALC: 22.9 % (ref 36–47)
HGB BLD-MCNC: 7.1 G/DL (ref 12–15.5)
LYMPHOCYTES # BLD: 1.6 X10^3/UL (ref 1–4.8)
LYMPHOCYTES NFR BLD AUTO: 18 % (ref 24–48)
MCH RBC QN AUTO: 19 PG (ref 25–35)
MCHC RBC AUTO-ENTMCNC: 31 G/DL (ref 31–37)
MCV RBC AUTO: 62 FL (ref 79–100)
MONO #: 1 X10^3/UL (ref 0–1.1)
MONOCYTES NFR BLD: 11 % (ref 0–9)
NEUT #: 6.2 X10^3/UL (ref 1.8–7.7)
NEUTROPHILS NFR BLD AUTO: 68 % (ref 31–73)
PLATELET # BLD AUTO: 149 X10^3/UL (ref 140–400)
POTASSIUM SERPL-SCNC: 3.7 MMOL/L (ref 3.5–5.1)
RBC # BLD AUTO: 3.71 X10^6/UL (ref 3.5–5.4)
SODIUM SERPL-SCNC: 142 MMOL/L (ref 136–145)
WBC # BLD AUTO: 9.1 X10^3/UL (ref 4–11)

## 2022-05-21 RX ADMIN — PIPERACILLIN SODIUM AND TAZOBACTAM SODIUM SCH MLS/HR: 3; .375 INJECTION, POWDER, LYOPHILIZED, FOR SOLUTION INTRAVENOUS at 05:58

## 2022-05-21 NOTE — NUR
Patient requested to leave AMA, Dr. Castellano aware, IV's removed from right antecubital and left 
wrist, AMA form signed by patient and this nurse, patient walked with  to private 
vehicle and advised patient that if she started to have symptoms to return to the ER.

## 2022-05-21 NOTE — PN
DATE: 05/21/2022

SUBJECTIVE:  The patient is resting, slightly propped up in bed, in no apparent 

distress.  On questioning her, she said that she had 2 bowel movements last 

night.  Denied any nausea or vomiting.  Denied any chills, rigors or fever.  

Denied any abdominal pain.



PHYSICAL EXAMINATION:

GENERAL:  When I examined her, she looked well and was clearly in no apparent 

respiratory distress.  She was pale, not jaundiced, cyanosed, no 

lymphadenopathy, no thyromegaly, no jugular venous distention.  No lower limb 

edema.

VITAL SIGNS:  Her heart rate was 81, blood pressure was 131/77, temperature was 

98.6, respiratory rate was 18 and oxygen saturation was 98%.

HEAD, EYES, EARS, NOSE, AND THROAT:  Normocephalic, atraumatic.

NECK:  Supple.

HEART:  Showed normal first and second heart sounds.  No gallop, rub or murmur.

CHEST:  Clear to auscultation, no crepitation or rhonchi.

ABDOMEN:  Distended, soft, nontender, no guarding or rigidity.  No organomegaly.

 All hernial orifices intact.  Bowel sounds normal.

NEUROLOGIC:  She is grossly intact.



Her intake and output are incompletely recorded.



LABORATORY DATA:  Her lab work this morning showed a white cell count 9.1, 

hemoglobin 7.1, hematocrit 22.9, MCV 62 and platelet count of 149,000.  Her 

chemistry showed a serum sodium 142, potassium 3.7, chloride 110, bicarbonate 

21, anion gap of 11, BUN 5, creatinine 0.6.  Estimated GFR was 108 mL per 

minute.  Her glucose was 79 and calcium was 7.9.  Her serum iron is 6.  Total 

iron binding capacity was 293, iron saturation was 2 and serum ferritin was 95.



ASSESSMENT:

1.  Recurrent syncopal episode.

2.  Marked leukocytosis and fever.

3.  Severe microcytic hypochromic anemia, likely due to dysfunctional uterine 

bleeding.  The patient had 3 sets of blood cultures done at [facility],

so far no growth after 3 days.  Her stool for C. diff was negative and stool for

enteric panel was negative for campylobacter, E. coli Shiga toxin, salmonella 

and Shigella.  The CT scan of the chest, abdomen and pelvis showed that she has 

large amount of mesorectal and presacral fat stranding edema.  No significant 

rectal urinary bladder wall thickening, finding nonspecific and may relate to 

mild acute proctitis.

4.  She has ill-defined 5.8 cm left adnexal cystic mass could represent a 

complicated hemorrhagic cyst.  No acute process in the chest.  She has sub 5 mm 

solid pulmonary nodules in the right lung.

5.  High risk patient.  Consider 12-months followup with a CT chest.  The 

patient decided to leave against medical advice despite my attempts to convince 

her otherwise.







AMM/PRA

DR: AMM/nts   DD: 05/21/2022 09:27

DT: 05/21/2022 09:54   TID: [id number]

## 2022-05-23 NOTE — PN
DATE: 05/20/2022

SUBJECTIVE:  The patient is sitting at the edge of the bed comfortably, in no 

apparent distress.  She denied any further episode of diarrhea.  Denied any 

abdominal pain.  Denied any nausea, vomiting and feeling dizzy or lightheaded.



OBJECTIVE:

GENERAL:  When I examined her, she looked pale, not jaundiced, cyanosed, no 

lymphadenopathy, no thyromegaly, no jugular venous distention.  No limb edema.

VITAL SIGNS:  Her heart rate was 80, blood pressure is 120/77, temperature was 

98.2, respiratory rate 20, and oxygen saturation was 99%.

HEAD, EYES, EARS, NOSE AND THROAT:  Normocephalic, atraumatic.

NECK:  Supple.

HEART:  Normal first and second heart sounds.  No gallop, rub or murmur.

CHEST:  Clear to auscultation, no crepitation or rhonchi.

ABDOMEN:  Distended, soft, nontender. No guarding or rigidity.  No organomegaly.

 All hernial orifice intact.  Bowel sounds normal.

NEUROLOGIC:  She is awake, alert, responding appropriately.  All cranial nerves 

intact.  She moves extremities without difficulty.  She ambulates without 

assistance or assistive devices.



Her intake and output were incompletely recorded.



LABORATORY DATA:  Her lab work this morning showed her white cell count is down 

to 14.8, hemoglobin 7.5, hematocrit 25, MCV 62 and platelet count of 151,000.  

Her sedimentation rate was 61 mm per hour.  Her chemistry showed a serum sodium 

141, potassium 3.4, chloride 111, bicarbonate 21, anion gap of 9, BUN 57, 

creatinine was 0.7.  Estimated GFR was 90 mL per minute.  Her glucose 112, 

calcium was 8.1.  Total bilirubin, AST, ALT, alkaline phosphatase were normal.  

Total protein 6.5, albumin was 2.4.  Her C-reactive protein was 296.4 mg per 

liter.  I did send stool for C. diff as she had multiple episodes of diarrhea 

while at [facility] and when she arrived here, I also sent stool for 

enteric bacterial panel.



IMAGING:  She has had a CT scan of the chest, abdomen and pelvis, the results of

which is still pending at the time of this dictation.



ASSESSMENT:  In summary, this is a 47-year-old  female patient who was 

otherwise healthy, who was admitted with recurrent syncopal episode and fever 

and she has marked leukocytosis.  Initial evaluation showed that her chest x-ray

was unremarkable.  Urinalysis was unremarkable.  She started having diarrhea 

while at [facility] and she was treated empirically with vancomycin and

Zosyn and her white cell count is steadily trending down from 32,000-14,000.  I 

spoke with her primary care physician, Dr. Mayo in [place]  and apparently 

the last lab work done there was in 2019.  At that time, her hemoglobin and 

hematocrit were normal.  However, she has now severe microcytic hypochromic 

anemia, most likely due to dysfunctional uterine bleeding that she is 

perimenopausal.



PLAN:  To continue with IV antibiotic.  I will check her iron stores and start 

her on Venofer.  I explained to the patient and her  that they are out of

network and that they have to be responsible for payment if they want to stay 

here and the  stated that this is the nearest hospital to them and they 

do not want to start from scratch in other hospital, they are okay staying here.







AMM/RAG/ALEJANDRA

DR: Gómez   DD: 05/20/2022 10:25

DT: 05/20/2022 10:52   TID: [id number]